# Patient Record
Sex: FEMALE | Race: BLACK OR AFRICAN AMERICAN | Employment: FULL TIME | URBAN - METROPOLITAN AREA
[De-identification: names, ages, dates, MRNs, and addresses within clinical notes are randomized per-mention and may not be internally consistent; named-entity substitution may affect disease eponyms.]

---

## 2017-11-22 ENCOUNTER — HOSPITAL ENCOUNTER (EMERGENCY)
Age: 62
Discharge: HOME | End: 2017-11-22
Attending: EMERGENCY MEDICINE

## 2017-11-22 VITALS
BODY MASS INDEX: 25.39 KG/M2 | HEART RATE: 93 BPM | WEIGHT: 158 LBS | TEMPERATURE: 97.3 F | HEIGHT: 66 IN | SYSTOLIC BLOOD PRESSURE: 143 MMHG | DIASTOLIC BLOOD PRESSURE: 71 MMHG | OXYGEN SATURATION: 97 % | RESPIRATION RATE: 18 BRPM

## 2017-11-22 DIAGNOSIS — J06.9 ACUTE UPPER RESPIRATORY INFECTION: Primary | ICD-10-CM

## 2017-11-22 RX ORDER — HYDROCODONE POLISTIREX AND CHLORPHENIRAMINE POLISTIREX 10; 8 MG/5ML; MG/5ML
5 SUSPENSION, EXTENDED RELEASE ORAL
Qty: 60 ML | Refills: 0 | Status: SHIPPED | OUTPATIENT
Start: 2017-11-22 | End: 2018-10-22

## 2017-11-22 NOTE — DISCHARGE INSTRUCTIONS
Upper Respiratory Infection (Cold): Care Instructions  Your Care Instructions    An upper respiratory infection, or URI, is an infection of the nose, sinuses, or throat. URIs are spread by coughs, sneezes, and direct contact. The common cold is the most frequent kind of URI. The flu and sinus infections are other kinds of URIs. Almost all URIs are caused by viruses. Antibiotics won't cure them. But you can treat most infections with home care. This may include drinking lots of fluids and taking over-the-counter pain medicine. You will probably feel better in 4 to 10 days. The doctor has checked you carefully, but problems can develop later. If you notice any problems or new symptoms, get medical treatment right away. Follow-up care is a key part of your treatment and safety. Be sure to make and go to all appointments, and call your doctor if you are having problems. It's also a good idea to know your test results and keep a list of the medicines you take. How can you care for yourself at home? · To prevent dehydration, drink plenty of fluids, enough so that your urine is light yellow or clear like water. Choose water and other caffeine-free clear liquids until you feel better. If you have kidney, heart, or liver disease and have to limit fluids, talk with your doctor before you increase the amount of fluids you drink. · Take an over-the-counter pain medicine, such as acetaminophen (Tylenol), ibuprofen (Advil, Motrin), or naproxen (Aleve). Read and follow all instructions on the label. · Before you use cough and cold medicines, check the label. These medicines may not be safe for young children or for people with certain health problems. · Be careful when taking over-the-counter cold or flu medicines and Tylenol at the same time. Many of these medicines have acetaminophen, which is Tylenol. Read the labels to make sure that you are not taking more than the recommended dose.  Too much acetaminophen (Tylenol) can be harmful. · Get plenty of rest.  · Do not smoke or allow others to smoke around you. If you need help quitting, talk to your doctor about stop-smoking programs and medicines. These can increase your chances of quitting for good. When should you call for help? Call 911 anytime you think you may need emergency care. For example, call if:  ? · You have severe trouble breathing. ?Call your doctor now or seek immediate medical care if:  ? · You seem to be getting much sicker. ? · You have new or worse trouble breathing. ? · You have a new or higher fever. ? · You have a new rash. ? Watch closely for changes in your health, and be sure to contact your doctor if:  ? · You have a new symptom, such as a sore throat, an earache, or sinus pain. ? · You cough more deeply or more often, especially if you notice more mucus or a change in the color of your mucus. ? · You do not get better as expected. Where can you learn more? Go to http://gerardo-andres.info/. Enter V485 in the search box to learn more about \"Upper Respiratory Infection (Cold): Care Instructions. \"  Current as of: May 12, 2017  Content Version: 11.4  © 8611-8638 Healthwise, Incorporated. Care instructions adapted under license by [facility] (which disclaims liability or warranty for this information). If you have questions about a medical condition or this instruction, always ask your healthcare professional. [place] [address] any warranty or liability for your use of this information.

## 2017-11-22 NOTE — UC PROVIDER NOTE
Patient is a 64 y.o. female presenting with cold symptoms. The history is provided by the patient. Cold Symptoms    This is a new problem. The current episode started 2 days ago. The problem has not changed since onset. There has been no fever. Associated symptoms include congestion and cough. Pertinent negatives include no chest pain and no abdominal pain. She has tried nothing for the symptoms. Past Medical History:   Diagnosis Date    Diabetes ([address]     Hypertension         History reviewed. No pertinent surgical history. Family History   Problem Relation Age of Onset    Diabetes Mother     Cancer Father         Social History     Social History    Marital status:      Spouse name: N/A    Number of children: N/A    Years of education: N/A     Occupational History    Not on file. Social History Main Topics    Smoking status: Never Smoker    Smokeless tobacco: Never Used    Alcohol use No    Drug use: Not on file    Sexual activity: Not on file     Other Topics Concern    Not on file     Social History Narrative                ALLERGIES: Pcn [penicillins]    Review of Systems   Constitutional: Negative for chills. HENT: Positive for congestion. Respiratory: Positive for cough. Cardiovascular: Negative for chest pain. Gastrointestinal: Negative for abdominal pain. Vitals:    11/22/17 1444   BP: 143/71   Pulse: 93   Resp: 18   Temp: 97.3 °F (36.3 °C)   SpO2: 97%   Weight: 71.7 kg (158 lb)   Height: 5' 6\" (1.676 m)       Physical Exam   Constitutional: She is oriented to person, place, and time. She appears well-developed and well-nourished. HENT:   Head: Normocephalic and atraumatic. Right Ear: External ear normal.   Left Ear: External ear normal.   Eyes: Conjunctivae and EOM are normal.   Cardiovascular: Normal rate and regular rhythm. Pulmonary/Chest: Effort normal and breath sounds normal.   Musculoskeletal: Normal range of motion.    Neurological: She is alert and oriented to person, place, and time. Skin: Skin is warm and dry. Psychiatric: She has a normal mood and affect. Her behavior is normal. Judgment and thought content normal.   Nursing note and vitals reviewed. MDM     Differential Diagnosis; Clinical Impression; Plan:     CLINICAL IMPRESSION:  Acute upper respiratory infection  (primary encounter diagnosis)    Plan:  1. Tussionex  2.   3.   Risk of Significant Complications, Morbidity, and/or Mortality:   Presenting problems: Moderate  Diagnostic procedures: Moderate  Management options:   Moderate  Progress:   Patient progress:  Stable      Procedures

## 2018-10-22 ENCOUNTER — OFFICE VISIT (OUTPATIENT)
Dept: URGENT CARE | Age: 63
End: 2018-10-22

## 2018-10-22 VITALS
SYSTOLIC BLOOD PRESSURE: 136 MMHG | WEIGHT: 144 LBS | OXYGEN SATURATION: 99 % | HEART RATE: 86 BPM | BODY MASS INDEX: 23.14 KG/M2 | HEIGHT: 66 IN | TEMPERATURE: 97.8 F | RESPIRATION RATE: 16 BRPM | DIASTOLIC BLOOD PRESSURE: 69 MMHG

## 2018-10-22 DIAGNOSIS — S92.514A CLOSED NONDISPLACED FRACTURE OF PROXIMAL PHALANX OF LESSER TOE OF RIGHT FOOT, INITIAL ENCOUNTER: Primary | ICD-10-CM

## 2018-10-22 NOTE — PATIENT INSTRUCTIONS
Toe Fracture: Rehab Exercises  Your Care Instructions  Here are some examples of typical rehabilitation exercises for your condition. Start each exercise slowly. Ease off the exercise if you start to have pain. Your doctor or physical therapist will tell you when you can start these exercises and which ones will work best for you. How to do the exercises  Passive toe exercise    1. Sit on the floor, with the heel of your affected foot on the floor. Use one hand to hold your foot steady. 2. Using the thumb and index (pointing) finger of your other hand, slowly bend your toe forward and then backward. Hold each position for about 15 seconds. 3. Repeat 2 to 4 times. Toe curl    1. Sit on the floor, with the heel of your affected foot on the floor. 2. Gently curl your toes forward and then backward. Hold each position for about 6 seconds. 3. Repeat 8 to 12 times. Towel scrunches    1. Sit in a chair, and place your affected foot on a towel on the floor. 2. Scrunch the towel toward you with your toes. Then use your toes to push the towel back into place. 3. Repeat 8 to 12 times. [place] pick-ups    1. Put some marbles on the floor next to a cup.  2. Sit in a chair, and use the toes of your affected foot to lift up one marble from the floor at a time. Then try to put the marble in the cup.  3. Repeat 8 to 12 times. Towel stretch    1. Sit with your legs extended and knees straight. 2. Place a towel or belt around your foot just under your toes. 3. Hold both ends of the towel or belt, with your hands above your knees. 4. Pull back with the towel or belt so that your foot stretches toward you. 5. Hold the position for at least 15 to 30 seconds. 6. Repeat 2 to 4 times. Follow-up care is a key part of your treatment and safety. Be sure to make and go to all appointments, and call your doctor if you are having problems.  It's also a good idea to know your test results and keep a list of the medicines you take. Where can you learn more? Go to http://gerardo-andres.info/. Enter [phone] in the search box to learn more about \"Toe Fracture: Rehab Exercises. \"  Current as of: November 29, 2017  Content Version: 11.8  © 9942-2145 Healthwise, [employer]. Care instructions adapted under license by [employer] (which disclaims liability or warranty for this information). If you have questions about a medical condition or this instruction, always ask your healthcare professional. [address] any warranty or liability for your use of this information.

## 2018-10-22 NOTE — PROGRESS NOTES
Toe Pain   This is a new problem. The current episode started 2 days ago. The problem occurs constantly. The problem has not changed since onset. Associated symptoms comments: Injury on the rt 5th toe - swelling of 5th toe . The symptoms are aggravated by walking, standing, bending and twisting. Nothing relieves the symptoms. Past Medical History:   Diagnosis Date    [address] [address]     Hypertension         History reviewed. No pertinent surgical history. Family History   Problem Relation Age of Onset    Diabetes Mother     Cancer Father         Social History     Socioeconomic History    Marital status:      Spouse name: Not on file    Number of children: Not on file    Years of education: Not on file    Highest education level: Not on file   Social Needs    Financial resource strain: Not on file    Food insecurity - worry: Not on file    Food insecurity - inability: Not on file   [employer] needs - medical: Not on file   [employer] needs - non-medical: Not on file   Occupational History    Not on file   Tobacco Use    Smoking status: Never Smoker    Smokeless tobacco: Never Used   Substance and Sexual Activity    Alcohol use: No    Drug use: Not on file    Sexual activity: Not on file   Other Topics Concern    Not on file   Social History Narrative    Not on file                ALLERGIES: Pcn [penicillins]    Review of Systems   Musculoskeletal: Positive for gait problem (mild ). All other systems reviewed and are negative. Vitals:    10/22/18 1658   BP: 136/69   Pulse: 86   Resp: 16   Temp: 97.8 °F (36.6 °C)   SpO2: 99%   Weight: 144 lb (65.3 kg)   Height: 5' 6\" (1.676 m)       Physical Exam   Musculoskeletal:        Right ankle: Normal.        Right foot: There is decreased range of motion, tenderness, bony tenderness and swelling. Feet:    Nursing note and vitals reviewed.       MDM    Procedures      ICD-10-CM ICD-9-CM    1. Closed nondisplaced fracture of proximal phalanx of lesser toe of right foot, initial encounter S92.514A 826.0 XR 5TH TOE RT MIN 2 V      APPLY CAROL TAPE   carol taping    Follow with podiatrist in 4 weeks     No orders of the defined types were placed in this encounter. No results found for any visits on 10/22/18. The patients condition was discussed with the patient and they understand. The patient is to follow up with primary care doctor. If signs and symptoms become worse the pt is to go to the ER. The patient is to take medications as prescribed.

## 2019-07-05 ENCOUNTER — HOSPITAL ENCOUNTER (OUTPATIENT)
Dept: CT IMAGING | Age: 64
Discharge: HOME | End: 2019-07-05
Attending: FAMILY MEDICINE
Payer: COMMERCIAL

## 2019-07-05 DIAGNOSIS — K63.9 BOWEL TROUBLE: ICD-10-CM

## 2019-07-05 DIAGNOSIS — R10.9 ABDOMINAL PAIN: ICD-10-CM

## 2019-07-05 LAB — CREAT BLD-MCNC: 0.9 MG/DL (ref 0.6–1.3)

## 2019-07-05 PROCEDURE — 74011636320 HC RX REV CODE- 636/320

## 2019-07-05 PROCEDURE — 82565 ASSAY OF CREATININE: CPT

## 2019-07-05 PROCEDURE — 74011250636 HC RX REV CODE- 250/636

## 2019-07-05 PROCEDURE — 74177 CT ABD & PELVIS W/CONTRAST: CPT

## 2019-07-05 RX ORDER — SODIUM CHLORIDE 9 MG/ML
50 INJECTION, SOLUTION INTRAVENOUS
Status: COMPLETED | OUTPATIENT
Start: 2019-07-05 | End: 2019-07-05

## 2019-07-05 RX ORDER — SODIUM CHLORIDE 9 MG/ML
10 INJECTION INTRAMUSCULAR; INTRAVENOUS; SUBCUTANEOUS ONCE
Status: COMPLETED | OUTPATIENT
Start: 2019-07-05 | End: 2019-07-05

## 2019-07-05 RX ADMIN — SODIUM CHLORIDE 10 ML: 9 INJECTION INTRAMUSCULAR; INTRAVENOUS; SUBCUTANEOUS at 19:00

## 2019-07-05 RX ADMIN — IOPAMIDOL 100 ML: 755 INJECTION, SOLUTION INTRAVENOUS at 19:00

## 2019-07-05 RX ADMIN — SODIUM CHLORIDE 50 ML/HR: 900 INJECTION, SOLUTION INTRAVENOUS at 19:00

## 2019-07-05 RX ADMIN — IOHEXOL 50 ML: 240 INJECTION, SOLUTION INTRATHECAL; INTRAVASCULAR; INTRAVENOUS; ORAL at 19:00

## 2020-05-13 ENCOUNTER — HOSPITAL ENCOUNTER (OUTPATIENT)
Dept: ULTRASOUND IMAGING | Age: 65
Discharge: HOME | End: 2020-05-13
Attending: OTOLARYNGOLOGY
Payer: COMMERCIAL

## 2020-05-13 DIAGNOSIS — R22.1 NECK MASS: ICD-10-CM

## 2020-05-13 PROCEDURE — 76536 US EXAM OF HEAD AND NECK: CPT

## 2020-05-26 ENCOUNTER — HOSPITAL ENCOUNTER (OUTPATIENT)
Dept: CT IMAGING | Age: 65
Discharge: HOME | End: 2020-05-26
Attending: OTOLARYNGOLOGY
Payer: COMMERCIAL

## 2020-05-26 DIAGNOSIS — R22.1 NECK MASS: ICD-10-CM

## 2020-05-26 LAB — CREAT BLD-MCNC: 1.7 MG/DL (ref 0.6–1.3)

## 2020-05-26 PROCEDURE — 82565 ASSAY OF CREATININE: CPT

## 2020-05-26 PROCEDURE — 70491 CT SOFT TISSUE NECK W/DYE: CPT

## 2020-05-26 PROCEDURE — 74011636320 HC RX REV CODE- 636/320: Performed by: OTOLARYNGOLOGY

## 2020-05-26 RX ORDER — SODIUM CHLORIDE 0.9 % (FLUSH) 0.9 %
10 SYRINGE (ML) INJECTION
Status: COMPLETED | OUTPATIENT
Start: 2020-05-26 | End: 2020-05-26

## 2020-05-26 RX ADMIN — IOPAMIDOL 100 ML: 755 INJECTION, SOLUTION INTRAVENOUS at 07:41

## 2020-05-26 RX ADMIN — Medication 10 ML: at 07:41

## 2020-11-09 ENCOUNTER — TRANSCRIBE ORDER (OUTPATIENT)
Dept: SCHEDULING | Age: 65
End: 2020-11-09

## 2020-11-09 DIAGNOSIS — R22.1 NECK MASS: ICD-10-CM

## 2020-11-09 DIAGNOSIS — D34 THYROID ADENOMA: Primary | ICD-10-CM

## 2020-11-11 ENCOUNTER — HOSPITAL ENCOUNTER (OUTPATIENT)
Dept: ULTRASOUND IMAGING | Age: 65
Discharge: HOME | End: 2020-11-11
Attending: OTOLARYNGOLOGY
Payer: MEDICARE

## 2020-11-11 DIAGNOSIS — D34 THYROID ADENOMA: ICD-10-CM

## 2020-11-11 DIAGNOSIS — R22.1 NECK MASS: ICD-10-CM

## 2020-11-11 PROCEDURE — 76536 US EXAM OF HEAD AND NECK: CPT

## 2020-12-14 ENCOUNTER — TRANSCRIBE ORDER (OUTPATIENT)
Dept: SCHEDULING | Age: 65
End: 2020-12-14

## 2020-12-14 DIAGNOSIS — R22.1 NECK MASS: Primary | ICD-10-CM

## 2020-12-18 ENCOUNTER — HOSPITAL ENCOUNTER (OUTPATIENT)
Dept: CT IMAGING | Age: 65
Discharge: HOME | End: 2020-12-18
Attending: OTOLARYNGOLOGY
Payer: MEDICARE

## 2020-12-18 DIAGNOSIS — R22.1 NECK MASS: ICD-10-CM

## 2020-12-18 PROCEDURE — 74011000258 HC RX REV CODE- 258: Performed by: OTOLARYNGOLOGY

## 2020-12-18 PROCEDURE — 74011000636 HC RX REV CODE- 636: Performed by: OTOLARYNGOLOGY

## 2020-12-18 PROCEDURE — 70491 CT SOFT TISSUE NECK W/DYE: CPT

## 2020-12-18 PROCEDURE — 82565 ASSAY OF CREATININE: CPT

## 2020-12-18 RX ORDER — SODIUM CHLORIDE 0.9 % (FLUSH) 0.9 %
10 SYRINGE (ML) INJECTION
Status: COMPLETED | OUTPATIENT
Start: 2020-12-18 | End: 2020-12-18

## 2020-12-18 RX ADMIN — Medication 10 ML: at 13:54

## 2020-12-18 RX ADMIN — SODIUM CHLORIDE 50 ML: 900 INJECTION, SOLUTION INTRAVENOUS at 13:54

## 2020-12-18 RX ADMIN — IOPAMIDOL 100 ML: 755 INJECTION, SOLUTION INTRAVENOUS at 13:54

## 2021-01-06 LAB — CREAT BLD-MCNC: 1.5 MG/DL (ref 0.6–1.3)

## 2021-03-16 ENCOUNTER — TRANSCRIBE ORDER (OUTPATIENT)
Dept: SCHEDULING | Age: 66
End: 2021-03-16

## 2021-03-16 DIAGNOSIS — C64.9 RENAL CARCINOMA, UNSPECIFIED LATERALITY (HCC): Primary | ICD-10-CM

## 2021-03-31 ENCOUNTER — HOSPITAL ENCOUNTER (OUTPATIENT)
Dept: NUCLEAR MEDICINE | Age: 66
Discharge: HOME | End: 2021-03-31
Attending: UROLOGY
Payer: MEDICARE

## 2021-03-31 DIAGNOSIS — C64.9 RENAL CARCINOMA, UNSPECIFIED LATERALITY (HCC): ICD-10-CM

## 2021-03-31 PROCEDURE — 78306 BONE IMAGING WHOLE BODY: CPT

## 2021-05-13 ENCOUNTER — TRANSCRIBE ORDER (OUTPATIENT)
Dept: SCHEDULING | Age: 66
End: 2021-05-13

## 2021-05-13 DIAGNOSIS — D34 THYROID ADENOMA: Primary | ICD-10-CM

## 2021-05-13 DIAGNOSIS — R22.1 NECK MASS: ICD-10-CM

## 2021-05-19 ENCOUNTER — HOSPITAL ENCOUNTER (OUTPATIENT)
Dept: ULTRASOUND IMAGING | Age: 66
Discharge: HOME | End: 2021-05-19
Attending: OTOLARYNGOLOGY
Payer: MEDICARE

## 2021-05-19 DIAGNOSIS — R22.1 NECK MASS: ICD-10-CM

## 2021-05-19 DIAGNOSIS — D34 THYROID ADENOMA: ICD-10-CM

## 2021-05-19 PROCEDURE — 76536 US EXAM OF HEAD AND NECK: CPT

## 2022-06-20 ENCOUNTER — TRANSCRIBE ORDER (OUTPATIENT)
Dept: SCHEDULING | Age: 67
End: 2022-06-20

## 2022-06-20 DIAGNOSIS — D34 THYROID ADENOMA: Primary | ICD-10-CM

## 2023-04-23 DIAGNOSIS — D34 THYROID ADENOMA: Primary | ICD-10-CM

## 2023-08-28 ENCOUNTER — NURSE ONLY (OUTPATIENT)
Age: 68
End: 2023-08-28
Payer: MEDICARE

## 2023-08-28 DIAGNOSIS — E11.65 UNCONTROLLED TYPE 2 DIABETES MELLITUS WITH HYPERGLYCEMIA (HCC): Primary | ICD-10-CM

## 2023-08-28 PROCEDURE — G0108 DIAB MANAGE TRN  PER INDIV: HCPCS | Performed by: DIETITIAN, REGISTERED

## 2023-08-28 NOTE — PROGRESS NOTES
[facility]  Diabetes Self-Management Education & Support Program    Reason for Referral: elevated A1C  Referral Source: Carson Cabrera MD  Services requested: DSMES       ASSESSMENT    From my perspective, the participant would benefit from [facility] specifically related to reducing risks, healthy eating, monitoring, taking medications, physical activity, healthy coping, and problem solving. Will adapt DSMES program to build on participant's skills score, preparedness score, and strengths in confidence score as noted in the Diabetes Skills, Confidence, and Preparedness Index. During the program, we will focus on providing DSMES that specifically addresses participant's interest in reducing risks, healthy eating, monitoring, taking medications, physical activity, healthy coping, and problem solving, as shown by their reported readiness to change. The participant would be best served by attending weekly group class series. Diabetes Self-Management Education Follow-up Visit: 9/5/23 group class  Mrs Niesha Seymour reports she is taking \"2 injectables a day,\" which means she could be taking the Ozempic 1x/day and instead of per week. She also reported taking \"3 of one and 10 of the other,\" so we could not confirm what she is actually doing. She is going to reach out to Dr Yecenia Rogers to confirm her meds     Clinical Presentation  Lee Barker is a 79 y.o.  female referred for diabetes self-management education. Participant has Type 2 DM on insulin for 1-10 years. Family history positive for diabetes. Patient reports not receiving DSMES services in the past. Most recent A1c value: 11.1    Diabetes-related medical history:  None reported    Diabetes-related medications:  Current dosing: This patient does not have an active medication from one of the medication groupers.   Metformin 1000 mg 2x/day  Jardiance 25 mg  Januvia 100 mg  Ozempic 0.25 mg - pt may be taking 1x/day at wrong

## 2023-09-11 ENCOUNTER — TELEPHONE (OUTPATIENT)
Age: 68
End: 2023-09-11

## 2023-09-11 NOTE — TELEPHONE ENCOUNTER
Patient's  Negrito Perez called, listed as patient contact to find out if a family member  could accompany patient to class, and to get diabetic class schedule. Information provide to Negrito Perez. No other concerns or questions.

## 2023-09-12 ENCOUNTER — NURSE ONLY (OUTPATIENT)
Age: 68
End: 2023-09-12

## 2023-09-12 DIAGNOSIS — E11.65 UNCONTROLLED TYPE 2 DIABETES MELLITUS WITH HYPERGLYCEMIA (HCC): Primary | ICD-10-CM

## 2023-09-14 NOTE — PROGRESS NOTES
[facility]  Diabetes Self-Management Education & Support Program  Encounter Note      SUMMARY  Diabetes self-care management training was completed related to healthy eating and monitoring. The participant will return on September 19 to continue DSMES related to taking medications and physical activity. The participant did not identify SMART Goal(s) and will practice knowledge and skills related to reducing risks, healthy eating and monitoring, being active and medications, and healthy coping and problem solving to improve diabetes self-management. EVALUATION:  Today we covered Healthy Eating and Monitoring, Ms. Casas expressed understanding for all topics, see boxes below for details. She is very enthusiastic about making changes to improve her BGs and is ready to use Healthy Plate, nutrition labels, and monitoring. She will be more strategic about looking at BGs before and after meals for feedback on how her food choices are impacting BGs. Ms. Lyndsay Khan will work with Letha Cisneros RD, [facility] to schedule a make-up date for DSMES Class 1. RECOMMENDATIONS:  Work on adding adequate protein to current meal choices, make sure carb portions follow the recommendations discussed. Consider switching to diet cranberry juice or drinking water instead. TOPICS DISCUSSED TODAY:  WHAT CAN I EAT? 61  HOW CAN BLOOD GLUCOSE MONITORING HELP ME? 60      Next provider visit is scheduled for 9/19/20223 for DSMES group class. SMART GOAL(S)   TBD  ACHIEVEMENT OF GOAL(S) :     2.     ACHIEVEMENT OF GOAL(S) :      3.     ACHIEVEMENT OF GOAL(S) :         DATE DSMES TOPIC EVALUATION     9/14/2023 WHAT CAN I EAT?    General principles   Determining a healthy weight   Nutritional terms & tools   Healthy Plate method   Carbohydrate Counting   Reading food labels   Free apps   Pregnancy recommendations      The participant   Uses Healthy Plate principles in constructing meals: Yes, in

## 2023-09-26 ENCOUNTER — NURSE ONLY (OUTPATIENT)
Age: 68
End: 2023-09-26

## 2023-09-26 DIAGNOSIS — E11.65 UNCONTROLLED TYPE 2 DIABETES MELLITUS WITH HYPERGLYCEMIA (HCC): Primary | ICD-10-CM

## 2023-09-27 NOTE — PROGRESS NOTES
9/27/2023 HOW DO I FIGURE OUT WHAT'S INFLUENCING MY BLOOD GLUCOSES? Problem solving using SOAR   Set goals   Sort options   Arrive at a plan   Reaffirm plan   Common problems in diabetes self-care   Hypoglycemia   Hyperglycemia   Sick days   Pattern management   Disaster preparedness plan        The participant has an action plan for   Hypoglycemia: Yes  Hyperglycemia: Yes  Sick days: Yes  During disasters: Yes    The participant needs to address see above. Judith Benites RD on 9/27/2023 at 8:13 AM    I have personally reviewed the health record, including provider notes, laboratory data and current medications before making these care and education recommendations. The time spent in this effort is included in the total time.   Total minutes: 120

## 2023-10-17 ENCOUNTER — NURSE ONLY (OUTPATIENT)
Age: 68
End: 2023-10-17
Payer: MEDICARE

## 2023-10-17 DIAGNOSIS — E11.65 UNCONTROLLED TYPE 2 DIABETES MELLITUS WITH HYPERGLYCEMIA (HCC): Primary | ICD-10-CM

## 2023-10-17 PROCEDURE — G0109 DIAB MANAGE TRN IND/GROUP: HCPCS | Performed by: DIETITIAN, REGISTERED

## 2023-10-18 NOTE — PROGRESS NOTES
[facility]  Diabetes Self-Management Education & Support Program  Encounter Note      SUMMARY  Diabetes self-care management training was completed related to taking medications and physical activity. The participant will return on October 19 to continue DSMES related to reducing risks. The participant did identify SMART Goal(s) and will practice knowledge and skills related to healthy eating and monitoring, being active and medications, and healthy coping and problem solving to improve diabetes self-management. EVALUATION:  Mrs Baron Grubbs was an active participant in today's class discussing medications and physical activity. She knew which oral medications she was taking but was not sure about the different injections she is taking. She is going to bring them in to her next visit with me. She walks a few miles every day and likes this routine. Overall, she feels like she is doing well and thinks that the high A1C has to do with her eating habits, which I am not so sure about. We will follow up on this. She verbalized understanding of all topics discussed and did not have any more questions. RECOMMENDATIONS:  Be familiar with the names of medications she is on     TOPICS DISCUSSED TODAY:  [address] AFFECT MY DIABETES? 60      Next provider visit is scheduled for 10/19/23 with me       SMART GOAL(S)   Bring diabetes medications to next appt  ACHIEVEMENT OF GOAL(S) : 0-24%         DATE DSMES TOPIC EVALUATION     10/18/2023 HOW DO MY DIABETES MEDICATIONS WORK?    Type 1 medications & methods   Insulin injections   Injection sites   Type 2 medications   Oral agents   GLP-1 agonists   Hypoglycemia symptoms & treatment   Glucagon emergency kits   General guidance regarding insulin use whether Type 1, 2 or gestational diabetes   Storage of insulin   Disposal    Traveling with medications   Barriers to medication adherence

## 2023-10-19 ENCOUNTER — OFFICE VISIT (OUTPATIENT)
Age: 68
End: 2023-10-19

## 2023-10-19 DIAGNOSIS — E11.65 UNCONTROLLED TYPE 2 DIABETES MELLITUS WITH HYPERGLYCEMIA (HCC): Primary | ICD-10-CM

## 2023-10-19 NOTE — PROGRESS NOTES
[facility]  Diabetes Self-Management Education & Support Program  Encounter Note      SUMMARY  Diabetes self-care management training was completed. The participant will return on November 01 to continue DSMES related to reducing risks. The participant did not identify SMART Goal(s) and will practice knowledge and skills related to healthy eating and monitoring, being active and medications, and healthy coping and problem solving to improve diabetes self-management. EVALUATION:  Mrs Obed Kenny was reminded to bring her medications to this visit this morning, and she forgot them, so we looked at some demo pens together to try and figure out what she is injecting. She reports it is 2 different pens, and one is [place], but the other is unknown. It is orange and blue, which should be Novolog, but she said it was not. She could not tell me the dose of either one she is taking. Since she did not have her meds, we discussed her BG. She reports they have been better, so we looked at her FreeStyle Report. She is scanning 1-2x/day so not all the data is there (about 45%). Discussed scanning more often and how it can be helpful to check upon wakeup, before meals, and before bed to prevent the gaps in data. Her 90 day values (only 55 days of data) show that her BG was doing better. When asked about it she said she will just forget to do the things she was supposed to be doing. As a provider, I have noticed some poor memory and trouble recalling things, which has affected her ability to make it to appointments and remember to do things we discussed or even remember things we recently talked about. Called Dr Hadley Araujo office and informed them of this. Also discovered that she is supposed to be taking 5 U Novolog at lunch and the [place] does not show up on her med list anymore.  I called pt after to confirm that she is taking Novolog (and she said yes she is) and that she has an